# Patient Record
Sex: MALE | Race: WHITE | NOT HISPANIC OR LATINO | Employment: OTHER | URBAN - METROPOLITAN AREA
[De-identification: names, ages, dates, MRNs, and addresses within clinical notes are randomized per-mention and may not be internally consistent; named-entity substitution may affect disease eponyms.]

---

## 2021-09-29 ENCOUNTER — HOSPITAL ENCOUNTER (OUTPATIENT)
Dept: RADIOLOGY | Facility: IMAGING CENTER | Age: 81
Discharge: HOME | End: 2021-09-29
Payer: COMMERCIAL

## 2021-09-29 DIAGNOSIS — K76.0 FATTY LIVER: ICD-10-CM

## 2021-09-29 DIAGNOSIS — R79.89 ELEVATED LFTS: ICD-10-CM

## 2021-09-29 PROCEDURE — 76981 USE PARENCHYMA: CPT

## 2022-09-19 ENCOUNTER — OFFICE VISIT (OUTPATIENT)
Dept: PODIATRY | Facility: CLINIC | Age: 82
End: 2022-09-19
Payer: COMMERCIAL

## 2022-09-19 VITALS
WEIGHT: 238.5 LBS | BODY MASS INDEX: 36.15 KG/M2 | DIASTOLIC BLOOD PRESSURE: 72 MMHG | HEIGHT: 68 IN | SYSTOLIC BLOOD PRESSURE: 127 MMHG | HEART RATE: 97 BPM

## 2022-09-19 DIAGNOSIS — G60.9 NEUROPATHY, PERIPHERAL, IDIOPATHIC: ICD-10-CM

## 2022-09-19 DIAGNOSIS — B35.1 ONYCHOMYCOSIS: Primary | ICD-10-CM

## 2022-09-19 PROCEDURE — 99202 OFFICE O/P NEW SF 15 MIN: CPT | Performed by: PODIATRIST

## 2022-09-19 RX ORDER — APIXABAN 5 MG/1
TABLET, FILM COATED ORAL
COMMUNITY
Start: 2022-09-13

## 2022-09-19 RX ORDER — BETAMETHASONE DIPROPIONATE 0.5 MG/G
CREAM TOPICAL
COMMUNITY
Start: 2022-08-04

## 2022-09-19 RX ORDER — METOPROLOL TARTRATE 50 MG/1
50 TABLET, FILM COATED ORAL 2 TIMES DAILY
COMMUNITY
Start: 2022-09-13

## 2022-09-19 RX ORDER — OMEPRAZOLE 20 MG/1
CAPSULE, DELAYED RELEASE ORAL
COMMUNITY

## 2022-09-19 NOTE — PROGRESS NOTES
Assessment/Plan:    The patient's pedal nail plates were sharply debrided with a pair of sterile nail clippers today without incident  He will follow-up on as-needed basis for routine foot care  Diagnoses and all orders for this visit:    Onychomycosis    Neuropathy, peripheral, idiopathic    Other orders  -     Eliquis 5 MG  -     metoprolol tartrate (LOPRESSOR) 50 mg tablet; Take 50 mg by mouth 2 (two) times a day  -     betamethasone, augmented, (DIPROLENE-AF) 0 05 % cream; Apply topically  -     omeprazole (PriLOSEC) 20 mg delayed release capsule; Take by mouth          Subjective:      Patient ID: Yarely Rose is a [de-identified] y o  male  The patient presents today for his initial consultation with [facility] with a chief complaint of painful elongated toenails  He states that due to his neuropathy, his recent knee surgery, and that he is on Eliquis, he does not feel safe trimming his own toenails  They are starting to catch on his socks and sheets and is causing him discomfort in his shoes  The following portions of the patient's history were reviewed and updated as appropriate: allergies, current medications, past family history, past medical history, past social history, past surgical history and problem list       PAST MEDICAL HISTORY:  Past Medical History:   Diagnosis Date    Acid reflux     HTN (hypertension)     Nosebleed        PAST SURGICAL HISTORY:  History reviewed  No pertinent surgical history  ALLERGIES:  Patient has no known allergies      MEDICATIONS:  Current Outpatient Medications   Medication Sig Dispense Refill    betamethasone, augmented, (DIPROLENE-AF) 0 05 % cream Apply topically      Eliquis 5 MG       metoprolol tartrate (LOPRESSOR) 50 mg tablet Take 50 mg by mouth 2 (two) times a day      omeprazole (PriLOSEC) 20 mg delayed release capsule Take by mouth      Omeprazole Magnesium (PriLOSEC) 10 MG PACK       lisinopril (ZESTRIL) 5 mg tablet No current facility-administered medications for this visit  SOCIAL HISTORY:  Social History     Socioeconomic History    Marital status: /Civil Union     Spouse name: None    Number of children: None    Years of education: None    Highest education level: None   Occupational History    None   Tobacco Use    Smoking status: Former Smoker    Smokeless tobacco: Never Used   Substance and Sexual Activity    Alcohol use: None    Drug use: None    Sexual activity: None   Other Topics Concern    None   Social History Narrative    None     Social Determinants of Health     Financial Resource Strain: Not on file   Food Insecurity: Not on file   Transportation Needs: Not on file   Physical Activity: Not on file   Stress: Not on file   Social Connections: Not on file   Intimate Partner Violence: Not on file   Housing Stability: Not on file        Review of Systems   Constitutional: Negative for chills and fever  HENT: Negative for ear pain and sore throat  Eyes: Negative for pain and visual disturbance  Respiratory: Negative for cough and shortness of breath  Cardiovascular: Negative for chest pain and palpitations  Gastrointestinal: Negative for abdominal pain and vomiting  Genitourinary: Negative for dysuria and hematuria  Musculoskeletal: Negative for arthralgias and back pain  Skin: Negative for color change and rash  Neurological: Negative for seizures and syncope  Psychiatric/Behavioral: Negative  All other systems reviewed and are negative  Objective:      /72   Pulse 97   Ht 5' 8" (1 727 m)   Wt 108 kg (238 lb 8 oz)   BMI 36 26 kg/m²          Physical Exam  Vitals reviewed  Constitutional:       Appearance: Normal appearance  HENT:      Head: Normocephalic and atraumatic  Nose: Nose normal    Eyes:      Conjunctiva/sclera: Conjunctivae normal       Pupils: Pupils are equal, round, and reactive to light     Cardiovascular:      Pulses: Dorsalis pedis pulses are 2+ on the right side and 2+ on the left side  Posterior tibial pulses are 1+ on the right side and 1+ on the left side  Pulmonary:      Effort: Pulmonary effort is normal    Feet:      Right foot:      Skin integrity: Skin integrity normal       Toenail Condition: Right toenails are abnormally thick and long  Fungal disease present  Left foot:      Skin integrity: Skin integrity normal       Toenail Condition: Left toenails are abnormally thick and long  Fungal disease present  Comments: His pedal nail plates are mildly dystrophic with discoloration, thickness, and brittleness times 10; there are no open wounds nor pre trophic changes to either foot; interdigital spaces are clear  Skin:     General: Skin is warm  Capillary Refill: Capillary refill takes less than 2 seconds  Neurological:      General: No focal deficit present  Mental Status: He is alert and oriented to person, place, and time  Psychiatric:         Mood and Affect: Mood normal          Behavior: Behavior normal          Thought Content:  Thought content normal

## 2023-01-06 ENCOUNTER — OFFICE VISIT (OUTPATIENT)
Dept: PODIATRY | Facility: CLINIC | Age: 83
End: 2023-01-06

## 2023-01-06 VITALS
HEART RATE: 54 BPM | SYSTOLIC BLOOD PRESSURE: 161 MMHG | HEIGHT: 68 IN | BODY MASS INDEX: 37.13 KG/M2 | OXYGEN SATURATION: 100 % | DIASTOLIC BLOOD PRESSURE: 84 MMHG | WEIGHT: 245 LBS

## 2023-01-06 DIAGNOSIS — B35.1 ONYCHOMYCOSIS: Primary | ICD-10-CM

## 2023-01-06 RX ORDER — KETOROLAC TROMETHAMINE 5 MG/ML
SOLUTION OPHTHALMIC
COMMUNITY
Start: 2022-10-18

## 2023-01-07 NOTE — PROGRESS NOTES
Assessment/Plan:     The patient's clinical examination today is consistent with painful onychomycosis to his feet bilaterally  The pedal nail plates were sharply debrided with sterile clippers and mechanically reduced utilizing a rotary bur without incident  There are no other acute pedal issues noted today  Commend follow-up in 2 to 3 months or as needed  There are no diagnoses linked to this encounter  Subjective:     Patient ID: Michael Thorne is a [de-identified] y o  male  The patient presents today with a chief complaint of painful elongated pedal nail plates to his feet bilaterally  He notes a long history of idiopathic peripheral neuropathy  He notes difficulty trimming his own nails secondary to their thickness and girth  They become tender when elongated in close toed shoe gear  He is also on Eliquis secondary to history of A  fib  PAST MEDICAL HISTORY:  Past Medical History:   Diagnosis Date   • Acid reflux    • HTN (hypertension)    • Nosebleed        PAST SURGICAL HISTORY:  History reviewed  No pertinent surgical history  ALLERGIES:  Patient has no known allergies  MEDICATIONS:  Current Outpatient Medications   Medication Sig Dispense Refill   • betamethasone, augmented, (DIPROLENE-AF) 0 05 % cream Apply topically     • Eliquis 5 MG      • ketorolac (ACULAR) 0 5 % ophthalmic solution      • metoprolol tartrate (LOPRESSOR) 50 mg tablet Take 50 mg by mouth 2 (two) times a day     • omeprazole (PriLOSEC) 20 mg delayed release capsule Take by mouth     • Omeprazole Magnesium (PriLOSEC) 10 MG PACK      • lisinopril (ZESTRIL) 5 mg tablet        No current facility-administered medications for this visit         SOCIAL HISTORY:  Social History     Socioeconomic History   • Marital status: /Civil Union     Spouse name: None   • Number of children: None   • Years of education: None   • Highest education level: None   Occupational History   • None   Tobacco Use   • Smoking status: Former   • Smokeless tobacco: Never   Substance and Sexual Activity   • Alcohol use: None   • Drug use: None   • Sexual activity: None   Other Topics Concern   • None   Social History Narrative   • None     Social Determinants of Health     Financial Resource Strain: Not on file   Food Insecurity: Not on file   Transportation Needs: Not on file   Physical Activity: Not on file   Stress: Not on file   Social Connections: Not on file   Intimate Partner Violence: Not on file   Housing Stability: Not on file        Review of Systems   Constitutional: Negative  HENT: Negative  Eyes: Negative  Respiratory: Negative  Cardiovascular: Negative  Endocrine: Negative  Musculoskeletal: Negative  Neurological: Negative  Hematological: Negative  Psychiatric/Behavioral: Negative  Objective:     Physical Exam  Vitals reviewed  Constitutional:       Appearance: Normal appearance  HENT:      Head: Normocephalic and atraumatic  Nose: Nose normal    Eyes:      Conjunctiva/sclera: Conjunctivae normal       Pupils: Pupils are equal, round, and reactive to light  Cardiovascular:      Pulses:           Dorsalis pedis pulses are 1+ on the right side and 1+ on the left side  Posterior tibial pulses are 1+ on the right side and 1+ on the left side  Pulmonary:      Effort: Pulmonary effort is normal    Feet:      Right foot:      Toenail Condition: Right toenails are abnormally thick and long  Fungal disease present  Left foot:      Toenail Condition: Left toenails are abnormally thick and long  Fungal disease present  Comments: The pedal nail plates are thickened and elongated x10 with mild tenderness palpation; no signs of infection noted bilaterally; the interdigital spaces are clear and without maceration  Skin:     General: Skin is warm  Capillary Refill: Capillary refill takes less than 2 seconds  Neurological:      General: No focal deficit present        Mental Status: He is alert and oriented to person, place, and time  Psychiatric:         Mood and Affect: Mood normal          Behavior: Behavior normal          Thought Content:  Thought content normal

## 2023-07-20 ENCOUNTER — OFFICE VISIT (OUTPATIENT)
Dept: PODIATRY | Facility: CLINIC | Age: 83
End: 2023-07-20
Payer: COMMERCIAL

## 2023-07-20 VITALS
SYSTOLIC BLOOD PRESSURE: 157 MMHG | BODY MASS INDEX: 37.89 KG/M2 | DIASTOLIC BLOOD PRESSURE: 62 MMHG | HEART RATE: 53 BPM | HEIGHT: 68 IN | OXYGEN SATURATION: 98 % | WEIGHT: 250 LBS

## 2023-07-20 DIAGNOSIS — B35.1 ONYCHOMYCOSIS: Primary | ICD-10-CM

## 2023-07-20 DIAGNOSIS — G60.9 NEUROPATHY, PERIPHERAL, IDIOPATHIC: ICD-10-CM

## 2023-07-20 PROCEDURE — 99212 OFFICE O/P EST SF 10 MIN: CPT | Performed by: PODIATRIST

## 2023-07-20 NOTE — PROGRESS NOTES
Assessment/Plan:     The patient's clinical examination today is consistent with mycotic pedal nail plates bilaterally x 10. There is a spicule of nail along the medial border of the right hallucal nail plate that is mildly tender to palpation. There are no open skin lesions. The interdigital spaces are clear without maceration. The pedal nails are sharply debrided with a sterile nail clipper without complication. Care was taken to remove the offending nail borders of the hallucal nail plates bilaterally. The nails were then reduced in thickness and girth utilizing a rotary bur. There are no other acute pedal issues today.     Recommend follow-up on as-needed basis.        Diagnoses and all orders for this visit:    Onychomycosis    Neuropathy, peripheral, idiopathic          Subjective:     Patient ID: Katie Contreras is a 82 y.o. male. The patient presents today for follow-up of painful pedal nail plates bilaterally. The patient notes a history of peripheral neuropathy and also notes difficulty with lower back and knee pain which prevents him from trimming his own nails. He feels that he is noticing some increase in his neuropathic symptoms and notices some deficits in his ability to ambulate for prolonged periods. He defers physical therapy consultation.         PAST MEDICAL HISTORY:  Past Medical History:   Diagnosis Date   • Acid reflux    • HTN (hypertension)    • Nosebleed        PAST SURGICAL HISTORY:  History reviewed. No pertinent surgical history. ALLERGIES:  Patient has no known allergies.     MEDICATIONS:  Current Outpatient Medications   Medication Sig Dispense Refill   • Eliquis 5 MG      • ergocalciferol (ERGOCALCIFEROL) 1.25 MG (08745 UT) capsule Take 50,000 Units by mouth     • metoprolol tartrate (LOPRESSOR) 50 mg tablet Take 50 mg by mouth 2 (two) times a day     • Omeprazole Magnesium (PriLOSEC) 10 MG PACK      • betamethasone, augmented, (DIPROLENE-AF) 0.05 % cream Apply topically     • ketorolac (ACULAR) 0.5 % ophthalmic solution      • lisinopril (ZESTRIL) 5 mg tablet      • omeprazole (PriLOSEC) 20 mg delayed release capsule Take by mouth       No current facility-administered medications for this visit. SOCIAL HISTORY:  Social History     Socioeconomic History   • Marital status: /Civil Union     Spouse name: None   • Number of children: None   • Years of education: None   • Highest education level: None   Occupational History   • None   Tobacco Use   • Smoking status: Former   • Smokeless tobacco: Never   Vaping Use   • Vaping Use: Never used   Substance and Sexual Activity   • Alcohol use: None   • Drug use: None   • Sexual activity: None   Other Topics Concern   • None   Social History Narrative   • None     Social Determinants of Health     Financial Resource Strain: Not on file   Food Insecurity: Not on file   Transportation Needs: Not on file   Physical Activity: Not on file   Stress: Not on file   Social Connections: Not on file   Intimate Partner Violence: Not on file   Housing Stability: Not on file        Review of Systems   Constitutional: Negative. HENT: Negative. Eyes: Negative. Respiratory: Negative. Cardiovascular: Negative. Endocrine: Negative. Musculoskeletal: Negative. Skin: Negative. Neurological: Negative. Hematological: Negative. Psychiatric/Behavioral: Negative. Objective:     Physical Exam  Vitals reviewed. Constitutional:       Appearance: Normal appearance. HENT:      Head: Normocephalic and atraumatic. Nose: Nose normal.   Eyes:      Conjunctiva/sclera: Conjunctivae normal.      Pupils: Pupils are equal, round, and reactive to light. Cardiovascular:      Pulses:           Dorsalis pedis pulses are 1+ on the right side and 1+ on the left side. Posterior tibial pulses are 1+ on the right side and 1+ on the left side.    Pulmonary:      Effort: Pulmonary effort is normal.   Feet: Right foot:      Skin integrity: Skin integrity normal.      Left foot:      Skin integrity: Skin integrity normal.      Comments: The patient's clinical examination today is consistent with mycotic pedal nail plates bilaterally x 10. There is a spicule of nail along the medial border of the right hallucal nail plate that is mildly tender to palpation. There are no open skin lesions. The interdigital spaces are clear without maceration. Skin:     General: Skin is warm. Capillary Refill: Capillary refill takes less than 2 seconds. Neurological:      General: No focal deficit present. Mental Status: He is alert and oriented to person, place, and time. Psychiatric:         Mood and Affect: Mood normal.         Behavior: Behavior normal.         Thought Content:  Thought content normal.

## 2023-10-24 ENCOUNTER — OFFICE VISIT (OUTPATIENT)
Dept: PODIATRY | Facility: CLINIC | Age: 83
End: 2023-10-24
Payer: COMMERCIAL

## 2023-10-24 VITALS
HEIGHT: 68 IN | BODY MASS INDEX: 36.68 KG/M2 | OXYGEN SATURATION: 100 % | SYSTOLIC BLOOD PRESSURE: 140 MMHG | WEIGHT: 242 LBS | HEART RATE: 59 BPM | DIASTOLIC BLOOD PRESSURE: 77 MMHG

## 2023-10-24 DIAGNOSIS — G60.9 NEUROPATHY, PERIPHERAL, IDIOPATHIC: ICD-10-CM

## 2023-10-24 DIAGNOSIS — B35.1 ONYCHOMYCOSIS: Primary | ICD-10-CM

## 2023-10-24 DIAGNOSIS — L60.3 ONYCHODYSTROPHY: ICD-10-CM

## 2023-10-24 PROCEDURE — 11721 DEBRIDE NAIL 6 OR MORE: CPT | Performed by: PODIATRIST

## 2023-10-24 RX ORDER — ZALEPLON 10 MG/1
20 CAPSULE ORAL DAILY
COMMUNITY
Start: 2023-10-01 | End: 2023-10-31

## 2023-10-24 NOTE — PROGRESS NOTES
Assessment/Plan:     The patient's clinical examination today is consistent with mycotic pedal nail plates bilaterally x 10. There is a spicule of nail along the medial border of the right hallucal nail plate that is mildly tender to palpation. There are no open skin lesions. The interdigital spaces are clear without maceration. The pedal nails are sharply debrided with a sterile nail clipper without complication. Care was taken to remove the offending nail borders of the hallucal nail plates bilaterally. The nails were then reduced in thickness and girth utilizing a rotary bur. There are no other acute pedal issues today. Recommend follow-up on as-needed basis. Diagnoses and all orders for this visit:    Onychomycosis    Onychodystrophy    Neuropathy, peripheral, idiopathic    Other orders  -     zaleplon (SONATA) 10 MG capsule; Take 20 mg by mouth daily          Subjective:     Patient ID: Ana Rosa Jackson is a 82 y.o. male. The patient presents today for follow-up of painful pedal nail plates bilaterally. The patient notes a history of peripheral neuropathy of uncertain etiology and also notes difficulty with lower back and knee pain which prevents him from trimming his own nails. PAST MEDICAL HISTORY:  Past Medical History:   Diagnosis Date    Acid reflux     HTN (hypertension)     Nosebleed        PAST SURGICAL HISTORY:  History reviewed. No pertinent surgical history. ALLERGIES:  Patient has no known allergies.     MEDICATIONS:  Current Outpatient Medications   Medication Sig Dispense Refill    Eliquis 5 MG       metoprolol tartrate (LOPRESSOR) 50 mg tablet Take 50 mg by mouth 2 (two) times a day      Omeprazole Magnesium (PriLOSEC) 10 MG PACK       zaleplon (SONATA) 10 MG capsule Take 20 mg by mouth daily      betamethasone, augmented, (DIPROLENE-AF) 0.05 % cream Apply topically      ergocalciferol (ERGOCALCIFEROL) 1.25 MG (33035 UT) capsule Take 50,000 Units by mouth ketorolac (ACULAR) 0.5 % ophthalmic solution       lisinopril (ZESTRIL) 5 mg tablet       omeprazole (PriLOSEC) 20 mg delayed release capsule Take by mouth       No current facility-administered medications for this visit. SOCIAL HISTORY:  Social History     Socioeconomic History    Marital status: /Civil Union     Spouse name: None    Number of children: None    Years of education: None    Highest education level: None   Occupational History    None   Tobacco Use    Smoking status: Former    Smokeless tobacco: Never   Vaping Use    Vaping Use: Never used   Substance and Sexual Activity    Alcohol use: None    Drug use: None    Sexual activity: None   Other Topics Concern    None   Social History Narrative    None     Social Determinants of Health     Financial Resource Strain: Not on file   Food Insecurity: Not on file   Transportation Needs: Not on file   Physical Activity: Not on file   Stress: Not on file   Social Connections: Not on file   Intimate Partner Violence: Not on file   Housing Stability: Not on file        Review of Systems   Constitutional: Negative. HENT: Negative. Eyes: Negative. Respiratory: Negative. Cardiovascular: Negative. Endocrine: Negative. Musculoskeletal: Negative. Neurological: Negative. Hematological: Negative. Psychiatric/Behavioral: Negative. Objective:     Physical Exam  Vitals reviewed. Constitutional:       Appearance: Normal appearance. HENT:      Head: Normocephalic and atraumatic. Nose: Nose normal.   Eyes:      Conjunctiva/sclera: Conjunctivae normal.      Pupils: Pupils are equal, round, and reactive to light. Cardiovascular:      Pulses:           Dorsalis pedis pulses are 1+ on the right side and 1+ on the left side. Posterior tibial pulses are 1+ on the right side and 1+ on the left side. Pulmonary:      Effort: Pulmonary effort is normal.   Feet:      Comments:    The patient's clinical examination today is consistent with mycotic pedal nail plates bilaterally x 10. There is a spicule of nail along the medial border of the right hallucal nail plate that is mildly tender to palpation. There are no open skin lesions. The interdigital spaces are clear without maceration. Skin:     General: Skin is warm. Capillary Refill: Capillary refill takes less than 2 seconds. Neurological:      General: No focal deficit present. Mental Status: He is alert and oriented to person, place, and time. Psychiatric:         Mood and Affect: Mood normal.         Behavior: Behavior normal.         Thought Content:  Thought content normal.

## 2024-01-18 ENCOUNTER — OFFICE VISIT (OUTPATIENT)
Dept: PODIATRY | Facility: CLINIC | Age: 84
End: 2024-01-18
Payer: COMMERCIAL

## 2024-01-18 VITALS
HEART RATE: 56 BPM | SYSTOLIC BLOOD PRESSURE: 122 MMHG | DIASTOLIC BLOOD PRESSURE: 68 MMHG | WEIGHT: 238 LBS | HEIGHT: 68 IN | OXYGEN SATURATION: 98 % | BODY MASS INDEX: 36.07 KG/M2

## 2024-01-18 DIAGNOSIS — G60.9 NEUROPATHY, PERIPHERAL, IDIOPATHIC: ICD-10-CM

## 2024-01-18 DIAGNOSIS — B35.1 ONYCHOMYCOSIS: Primary | ICD-10-CM

## 2024-01-18 PROCEDURE — 11721 DEBRIDE NAIL 6 OR MORE: CPT | Performed by: PODIATRIST

## 2024-01-18 NOTE — PROGRESS NOTES
Assessment/Plan:     The patient's clinical examination today is consistent with mycotic pedal nail plates bilaterally x 10.  The nails are thickened, discolored, with brittleness and subungual debris.  There are no open skin lesions.  The interdigital spaces are clear without maceration.  Skin is thin with decreased turgor.  There is diminished hair growth of the bilateral lower extremities.     The pedal nails are sharply debrided with a sterile nail clipper without complication.  The nails were then reduced in thickness and girth utilizing a rotary bur.  There are no other acute pedal issues today.     Recommend follow-up on as-needed basis.        Diagnoses and all orders for this visit:    Onychomycosis    Neuropathy, peripheral, idiopathic          Subjective:     Patient ID: Yissel Hardwick is a 83 y.o. male.      The patient presents today for follow-up of painful pedal nail plates bilaterally.  The patient notes a history of peripheral neuropathy of uncertain etiology and also notes difficulty with lower back and knee pain which prevents him from trimming his own nails.  He states that the numbness in his feet seems to be holding steady and does not seem to be progressing.        PAST MEDICAL HISTORY:  Past Medical History:   Diagnosis Date    Acid reflux     HTN (hypertension)     Nosebleed        PAST SURGICAL HISTORY:  History reviewed. No pertinent surgical history.     ALLERGIES:  Patient has no known allergies.    MEDICATIONS:  Current Outpatient Medications   Medication Sig Dispense Refill    betamethasone, augmented, (DIPROLENE-AF) 0.05 % cream Apply topically      Eliquis 5 MG       metoprolol tartrate (LOPRESSOR) 50 mg tablet Take 50 mg by mouth 2 (two) times a day      Omeprazole Magnesium (PriLOSEC) 10 MG PACK       ergocalciferol (ERGOCALCIFEROL) 1.25 MG (40966 UT) capsule Take 50,000 Units by mouth      ketorolac (ACULAR) 0.5 % ophthalmic solution       lisinopril (ZESTRIL) 5 mg tablet        omeprazole (PriLOSEC) 20 mg delayed release capsule Take by mouth      zaleplon (SONATA) 10 MG capsule Take 20 mg by mouth daily       No current facility-administered medications for this visit.       SOCIAL HISTORY:  Social History     Socioeconomic History    Marital status: /Civil Union     Spouse name: None    Number of children: None    Years of education: None    Highest education level: None   Occupational History    None   Tobacco Use    Smoking status: Former    Smokeless tobacco: Never   Vaping Use    Vaping status: Never Used   Substance and Sexual Activity    Alcohol use: None    Drug use: None    Sexual activity: None   Other Topics Concern    None   Social History Narrative    None     Social Determinants of Health     Financial Resource Strain: Not on file   Food Insecurity: Not on file   Transportation Needs: Not on file   Physical Activity: Not on file   Stress: Not on file   Social Connections: Not on file   Intimate Partner Violence: Not on file   Housing Stability: Not on file        Review of Systems   Constitutional: Negative.    HENT: Negative.     Eyes: Negative.    Respiratory: Negative.     Cardiovascular: Negative.    Endocrine: Negative.    Musculoskeletal: Negative.    Neurological: Negative.    Hematological: Negative.    Psychiatric/Behavioral: Negative.           Objective:     Physical Exam  Vitals reviewed.   Constitutional:       Appearance: Normal appearance.   HENT:      Head: Normocephalic and atraumatic.      Nose: Nose normal.   Eyes:      Conjunctiva/sclera: Conjunctivae normal.      Pupils: Pupils are equal, round, and reactive to light.   Cardiovascular:      Pulses:           Dorsalis pedis pulses are 1+ on the right side and 1+ on the left side.        Posterior tibial pulses are 1+ on the right side and 1+ on the left side.   Pulmonary:      Effort: Pulmonary effort is normal.   Feet:      Right foot:      Toenail Condition: Right toenails are abnormally thick  and long. Fungal disease present.     Left foot:      Toenail Condition: Left toenails are abnormally thick and long. Fungal disease present.     Comments: The patient's clinical examination today is consistent with mycotic pedal nail plates bilaterally x 10.  The nails are thickened, discolored, with brittleness and subungual debris.  There are no open skin lesions.  The interdigital spaces are clear without maceration.  Skin is thin with decreased turgor.  There is diminished hair growth of the bilateral lower extremities.  Skin:     General: Skin is warm.      Capillary Refill: Capillary refill takes 2 to 3 seconds.   Neurological:      General: No focal deficit present.      Mental Status: He is alert and oriented to person, place, and time.   Psychiatric:         Mood and Affect: Mood normal.         Behavior: Behavior normal.         Thought Content: Thought content normal.

## 2024-04-18 ENCOUNTER — OFFICE VISIT (OUTPATIENT)
Dept: PODIATRY | Facility: CLINIC | Age: 84
End: 2024-04-18
Payer: COMMERCIAL

## 2024-04-18 VITALS
HEART RATE: 56 BPM | BODY MASS INDEX: 35.92 KG/M2 | WEIGHT: 237 LBS | HEIGHT: 68 IN | DIASTOLIC BLOOD PRESSURE: 85 MMHG | SYSTOLIC BLOOD PRESSURE: 126 MMHG | OXYGEN SATURATION: 98 %

## 2024-04-18 DIAGNOSIS — G60.9 NEUROPATHY, PERIPHERAL, IDIOPATHIC: ICD-10-CM

## 2024-04-18 DIAGNOSIS — I73.9 PVD (PERIPHERAL VASCULAR DISEASE) (HCC): ICD-10-CM

## 2024-04-18 DIAGNOSIS — B35.1 ONYCHOMYCOSIS: Primary | ICD-10-CM

## 2024-04-18 PROCEDURE — 11721 DEBRIDE NAIL 6 OR MORE: CPT | Performed by: PODIATRIST

## 2024-04-18 NOTE — PROGRESS NOTES
Assessment/Plan:     The patient's clinical examination today is consistent with mycotic pedal nail plates bilaterally x 10.  The nails are thickened, discolored, with brittleness and subungual debris.  There are no open skin lesions.  The interdigital spaces are clear without maceration.  Skin is thin with decreased turgor.  DP and PT pulses are nonpalpable.  Temperature gradient is increased bilaterally.  There is hair growth of the bilateral lower extremities.  Vibratory and epicritic sensations are absent secondary to peripheral neuropathy of the bilateral lower extremities.    The pedal nails were sharply debrided with a sterile nail clipper without complication.  The nails were then mechanically reduced in thickness and girth utilizing a rotary bur.  There are no other acute pedal issues today.     The patient will follow-up in 2 to 3 months for at risk footcare.        Diagnoses and all orders for this visit:    Onychomycosis    Neuropathy, peripheral, idiopathic    PVD (peripheral vascular disease) ([facility]) [I73.9]          Subjective:     Patient ID: Yissel Hradwick is a 83 y.o. male.    The patient presents today for follow-up of painful pedal nail plates bilaterally.  The patient notes a history of peripheral neuropathy of uncertain etiology and also notes difficulty with lower back and knee pain which prevents him from trimming his own nails.  He states that the numbness in his feet is very dense and he cannot feel the even palpating his feet.           PAST MEDICAL HISTORY:  Past Medical History:   Diagnosis Date    Acid reflux     HTN (hypertension)     Nosebleed        PAST SURGICAL HISTORY:  History reviewed. No pertinent surgical history.     ALLERGIES:  Patient has no known allergies.    MEDICATIONS:  Current Outpatient Medications   Medication Sig Dispense Refill    betamethasone, augmented, (DIPROLENE-AF) 0.05 % cream Apply topically      Eliquis 5 MG       metoprolol tartrate (LOPRESSOR) 50 mg tablet  Take 50 mg by mouth 2 (two) times a day      Omeprazole Magnesium (PriLOSEC) 10 MG PACK       ergocalciferol (ERGOCALCIFEROL) 1.25 MG (25023 UT) capsule Take 50,000 Units by mouth      ketorolac (ACULAR) 0.5 % ophthalmic solution       lisinopril (ZESTRIL) 5 mg tablet       omeprazole (PriLOSEC) 20 mg delayed release capsule Take by mouth      zaleplon (SONATA) 10 MG capsule Take 20 mg by mouth daily       No current facility-administered medications for this visit.       SOCIAL HISTORY:  Social History     Socioeconomic History    Marital status: /Civil Union     Spouse name: None    Number of children: None    Years of education: None    Highest education level: None   Occupational History    None   Tobacco Use    Smoking status: Former    Smokeless tobacco: Never   Vaping Use    Vaping status: Never Used   Substance and Sexual Activity    Alcohol use: None    Drug use: None    Sexual activity: None   Other Topics Concern    None   Social History Narrative    None     Social Determinants of Health     Financial Resource Strain: Not on file   Food Insecurity: Not on file   Transportation Needs: Not on file   Physical Activity: Not on file   Stress: Not on file   Social Connections: Not on file   Intimate Partner Violence: Not on file   Housing Stability: Not on file        Review of Systems   Constitutional: Negative.    HENT: Negative.     Eyes: Negative.    Respiratory: Negative.     Cardiovascular: Negative.    Endocrine: Negative.    Musculoskeletal: Negative.    Neurological: Negative.    Hematological: Negative.    Psychiatric/Behavioral: Negative.           Objective:     Physical Exam  Vitals reviewed.   Constitutional:       Appearance: Normal appearance.   HENT:      Head: Normocephalic and atraumatic.      Nose: Nose normal.   Eyes:      Conjunctiva/sclera: Conjunctivae normal.      Pupils: Pupils are equal, round, and reactive to light.   Cardiovascular:      Pulses:           Dorsalis pedis  pulses are 0 on the right side and 0 on the left side.        Posterior tibial pulses are 0 on the right side and 0 on the left side.   Pulmonary:      Effort: Pulmonary effort is normal.   Feet:      Right foot:      Skin integrity: Skin integrity normal.      Toenail Condition: Right toenails are abnormally thick and long. Fungal disease present.     Left foot:      Skin integrity: Skin integrity normal.      Toenail Condition: Left toenails are abnormally thick and long. Fungal disease present.     Comments: The patient's clinical examination today is consistent with mycotic pedal nail plates bilaterally x 10.  The nails are thickened, discolored, with brittleness and subungual debris.  There are no open skin lesions.  The interdigital spaces are clear without maceration.  Skin is thin with decreased turgor.  DP and PT pulses are nonpalpable.  Temperature gradient is increased bilaterally.  There is hair growth of the bilateral lower extremities.  Vibratory and epicritic sensations are absent secondary to peripheral neuropathy of the bilateral lower extremities.  Skin:     General: Skin is warm.      Capillary Refill: Capillary refill takes less than 2 seconds.   Neurological:      General: No focal deficit present.      Mental Status: He is alert and oriented to person, place, and time.   Psychiatric:         Mood and Affect: Mood normal.         Behavior: Behavior normal.         Thought Content: Thought content normal.

## 2024-07-18 ENCOUNTER — OFFICE VISIT (OUTPATIENT)
Dept: PODIATRY | Facility: CLINIC | Age: 84
End: 2024-07-18
Payer: COMMERCIAL

## 2024-07-18 VITALS
DIASTOLIC BLOOD PRESSURE: 96 MMHG | BODY MASS INDEX: 35.92 KG/M2 | HEIGHT: 68 IN | HEART RATE: 54 BPM | SYSTOLIC BLOOD PRESSURE: 127 MMHG | OXYGEN SATURATION: 95 % | WEIGHT: 237 LBS

## 2024-07-18 DIAGNOSIS — G60.9 NEUROPATHY, PERIPHERAL, IDIOPATHIC: ICD-10-CM

## 2024-07-18 DIAGNOSIS — B35.1 ONYCHOMYCOSIS: Primary | ICD-10-CM

## 2024-07-18 DIAGNOSIS — I73.9 PVD (PERIPHERAL VASCULAR DISEASE) (HCC): ICD-10-CM

## 2024-07-18 PROCEDURE — RECHECK: Performed by: PODIATRIST

## 2024-07-18 PROCEDURE — 11721 DEBRIDE NAIL 6 OR MORE: CPT | Performed by: PODIATRIST

## 2024-07-18 NOTE — PROGRESS NOTES
Assessment/Plan:     The patient's clinical examination today is consistent with mycotic pedal nail plates bilaterally x 10.  The nails are thickened, discolored, brittle, with subungual debris.  There are no open skin lesions.  The interdigital spaces are clear without maceration.  Skin is thin with decreased turgor.  DP and PT pulses are nonpalpable.  Temperature gradient is increased bilaterally.  There is hair growth of the bilateral lower extremities.  Vibratory and epicritic sensations are absent secondary to peripheral neuropathy of the bilateral lower extremities.     The pedal nails were sharply debrided with a sterile nail clipper without complication.  The nails were then mechanically reduced in thickness and girth utilizing a rotary bur.  There are no other acute pedal issues today.     The patient will follow-up in 2 to 3 months for at risk footcare.        Diagnoses and all orders for this visit:    Onychomycosis    Neuropathy, peripheral, idiopathic    PVD (peripheral vascular disease) ([facility]) [I73.9]          Subjective:     Patient ID: Yissel Hardwick is a 83 y.o. male.    The patient presents today for follow-up of painful pedal nail plates bilaterally.  The patient notes a history of peripheral neuropathy of uncertain etiology and also notes difficulty with lower back and knee pain which prevents him from trimming his own nails.  He has a history of dense peripheral neuropathy stemming from chronic lower back issues.  He does note that he be trying acupuncture to see if it helps.  He also notes that he may be interested in consultation with pain and spine services.           PAST MEDICAL HISTORY:  Past Medical History:   Diagnosis Date    Acid reflux     HTN (hypertension)     Nosebleed        PAST SURGICAL HISTORY:  History reviewed. No pertinent surgical history.     ALLERGIES:  Patient has no known allergies.    MEDICATIONS:  Current Outpatient Medications   Medication Sig Dispense Refill     betamethasone, augmented, (DIPROLENE-AF) 0.05 % cream Apply topically      Eliquis 5 MG       metoprolol tartrate (LOPRESSOR) 50 mg tablet Take 50 mg by mouth 2 (two) times a day      Omeprazole Magnesium (PriLOSEC) 10 MG PACK       zaleplon (SONATA) 10 MG capsule Take 20 mg by mouth daily      ergocalciferol (ERGOCALCIFEROL) 1.25 MG (85813 UT) capsule Take 50,000 Units by mouth      ketorolac (ACULAR) 0.5 % ophthalmic solution       lisinopril (ZESTRIL) 5 mg tablet       omeprazole (PriLOSEC) 20 mg delayed release capsule Take by mouth       No current facility-administered medications for this visit.       SOCIAL HISTORY:  Social History     Socioeconomic History    Marital status: /Civil Union     Spouse name: None    Number of children: None    Years of education: None    Highest education level: None   Occupational History    None   Tobacco Use    Smoking status: Former    Smokeless tobacco: Never   Vaping Use    Vaping status: Never Used   Substance and Sexual Activity    Alcohol use: None    Drug use: None    Sexual activity: None   Other Topics Concern    None   Social History Narrative    None     Social Determinants of Health     Financial Resource Strain: Not on file   Food Insecurity: Not on file   Transportation Needs: Not on file   Physical Activity: Not on file   Stress: Not on file   Social Connections: Not on file   Intimate Partner Violence: Not on file   Housing Stability: Not on file        Review of Systems   Constitutional: Negative.    HENT: Negative.     Eyes: Negative.    Respiratory: Negative.     Cardiovascular: Negative.    Endocrine: Negative.    Musculoskeletal: Negative.    Neurological: Negative.    Hematological: Negative.    Psychiatric/Behavioral: Negative.           Objective:     Physical Exam  Vitals reviewed.   Constitutional:       Appearance: Normal appearance.   HENT:      Head: Normocephalic and atraumatic.      Nose: Nose normal.   Eyes:      Conjunctiva/sclera:  Conjunctivae normal.      Pupils: Pupils are equal, round, and reactive to light.   Cardiovascular:      Pulses:           Dorsalis pedis pulses are 0 on the right side and 0 on the left side.        Posterior tibial pulses are 0 on the right side and 0 on the left side.   Pulmonary:      Effort: Pulmonary effort is normal.   Feet:      Right foot:      Skin integrity: Skin integrity normal.      Toenail Condition: Right toenails are abnormally thick and long. Fungal disease present.     Left foot:      Skin integrity: Skin integrity normal.      Toenail Condition: Left toenails are abnormally thick and long. Fungal disease present.     Comments: The patient's clinical examination today is consistent with mycotic pedal nail plates bilaterally x 10.  The nails are thickened, discolored, brittle, with subungual debris.  There are no open skin lesions.  The interdigital spaces are clear without maceration.  Skin is thin with decreased turgor.  DP and PT pulses are nonpalpable.  Temperature gradient is increased bilaterally.  There is hair growth of the bilateral lower extremities.  Vibratory and epicritic sensations are absent secondary to peripheral neuropathy of the bilateral lower extremities.  Skin:     General: Skin is warm.      Capillary Refill: Capillary refill takes 2 to 3 seconds.   Neurological:      General: No focal deficit present.      Mental Status: He is alert and oriented to person, place, and time.   Psychiatric:         Mood and Affect: Mood normal.         Behavior: Behavior normal.         Thought Content: Thought content normal.

## 2024-10-15 ENCOUNTER — OFFICE VISIT (OUTPATIENT)
Dept: PODIATRY | Facility: CLINIC | Age: 84
End: 2024-10-15
Payer: COMMERCIAL

## 2024-10-15 VITALS
WEIGHT: 238 LBS | SYSTOLIC BLOOD PRESSURE: 125 MMHG | OXYGEN SATURATION: 97 % | BODY MASS INDEX: 36.07 KG/M2 | DIASTOLIC BLOOD PRESSURE: 75 MMHG | HEIGHT: 68 IN | HEART RATE: 56 BPM

## 2024-10-15 DIAGNOSIS — I73.9 PVD (PERIPHERAL VASCULAR DISEASE) (HCC): ICD-10-CM

## 2024-10-15 DIAGNOSIS — G60.9 NEUROPATHY, PERIPHERAL, IDIOPATHIC: ICD-10-CM

## 2024-10-15 DIAGNOSIS — B35.1 ONYCHOMYCOSIS: Primary | ICD-10-CM

## 2024-10-15 PROCEDURE — RECHECK: Performed by: PODIATRIST

## 2024-10-15 PROCEDURE — 11721 DEBRIDE NAIL 6 OR MORE: CPT | Performed by: PODIATRIST

## 2024-10-15 NOTE — PROGRESS NOTES
Assessment/Plan:     The patient's clinical examination today is consistent with mycotic pedal nail plates bilaterally x 10.  The nails are thickened, discolored, brittle, with subungual debris.  There are no open skin lesions.  The interdigital spaces are clear without maceration.  Skin is thin with decreased turgor.  DP and PT pulses are nonpalpable.  Temperature gradient is increased bilaterally.  There is absence of hair growth of the bilateral lower extremities.  Vibratory and epicritic sensations are absent secondary to peripheral neuropathy of the bilateral lower extremities.  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     The pedal nails were sharply debrided with a sterile nail clipper without complication.  The nails were then mechanically reduced in thickness and girth utilizing a rotary bur.  There are no other acute pedal issues today.  He is currently ambulating with a carbon fiber AFOs which does help to manage his dropfoot.     The patient will follow-up in 2 to 3 months for at risk footcare.        Diagnoses and all orders for this visit:    Onychomycosis    Neuropathy, peripheral, idiopathic    PVD (peripheral vascular disease) ([facility]) [I73.9]          Subjective:     Patient ID: Yissel Hardwick is a 84 y.o. male.    The patient presents today for follow-up of painful pedal nail plates bilaterally.  The patient notes a history of peripheral neuropathy of uncertain etiology and notes a progressive dropfoot deformity which requires use of carbon fiber AFOs.  He does have an extensive history of chronic low back pain and does note an upcoming consultation with a new pain and spine physician.        Review of Systems   Constitutional: Negative.    HENT: Negative.     Eyes: Negative.    Respiratory: Negative.     Cardiovascular: Negative.    Endocrine: Negative.    Musculoskeletal: Negative.    Neurological: Negative.    Hematological: Negative.    Psychiatric/Behavioral:  Negative.           Objective:     Physical Exam  Vitals reviewed.   Constitutional:       Appearance: Normal appearance.   HENT:      Head: Normocephalic and atraumatic.      Nose: Nose normal.   Eyes:      Conjunctiva/sclera: Conjunctivae normal.      Pupils: Pupils are equal, round, and reactive to light.   Cardiovascular:      Pulses:           Dorsalis pedis pulses are 1+ on the right side and 1+ on the left side.        Posterior tibial pulses are 0 on the right side and 0 on the left side.   Pulmonary:      Effort: Pulmonary effort is normal.   Musculoskeletal:      Right foot: Foot drop present.      Left foot: Foot drop present.   Feet:      Right foot:      Skin integrity: Skin integrity normal.      Toenail Condition: Right toenails are abnormally thick and long. Fungal disease present.     Left foot:      Skin integrity: Skin integrity normal.      Toenail Condition: Left toenails are abnormally thick and long. Fungal disease present.     Comments: The patient's clinical examination today is consistent with mycotic pedal nail plates bilaterally x 10.  The nails are thickened, discolored, brittle, with subungual debris.  There are no open skin lesions.  The interdigital spaces are clear without maceration.  Skin is thin with decreased turgor.  DP and PT pulses are nonpalpable.  Temperature gradient is increased bilaterally.  There is absence of hair growth of the bilateral lower extremities.  Vibratory and epicritic sensations are absent secondary to peripheral neuropathy of the bilateral lower extremities.  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     Skin:     General: Skin is warm.      Capillary Refill: Capillary refill takes 2 to 3 seconds.   Neurological:      General: No focal deficit present.      Mental Status: He is alert and oriented to person, place, and time.   Psychiatric:         Mood and Affect: Mood normal.         Behavior: Behavior normal.          Thought Content: Thought content normal.

## 2024-10-29 ENCOUNTER — CONSULT (OUTPATIENT)
Dept: PAIN MEDICINE | Facility: CLINIC | Age: 84
End: 2024-10-29
Payer: COMMERCIAL

## 2024-10-29 VITALS — BODY MASS INDEX: 36.07 KG/M2 | WEIGHT: 238 LBS | HEIGHT: 68 IN

## 2024-10-29 DIAGNOSIS — M47.816 LUMBAR SPONDYLOSIS: Primary | ICD-10-CM

## 2024-10-29 DIAGNOSIS — Z98.1 S/P LUMBAR SPINAL FUSION: ICD-10-CM

## 2024-10-29 PROCEDURE — G2211 COMPLEX E/M VISIT ADD ON: HCPCS | Performed by: ANESTHESIOLOGY

## 2024-10-29 PROCEDURE — 99204 OFFICE O/P NEW MOD 45 MIN: CPT | Performed by: ANESTHESIOLOGY

## 2024-10-29 NOTE — PROGRESS NOTES
Assessment  1. Lumbar spondylosis    2. S/P lumbar spinal fusion      Patient presenting with ongoing chronic back pain for greater than 10 years, worsening over the past year.  Pain is consistent with lumbar spondylosis, s/p lumbar spinal fusion accompanied by pain 6/10 on the pain scale with inability to participate in IADLs for >6 weeks.  Patient has tried numerous medication classes with varied benefit. He takes oxycodone PRN with benefit.   Denies any bowel or bladder incontinence, saddle anesthesia.    In regards to the patient's  pathology, we discussed the various treatment options including physical therapy, chiropractic treatment, medication management, activity modifications, interventional spine procedures.     Independently reviewed and interpreted prior lumbar MRI and CT lumbar spine which showed multilevel spondylosis with intact prior lumbar decompression and hardware. No significant spinal canal or foraminal stenosis.    The patient had prior ESIs with relief- ultimately these provided no further relief. He then had lumbar RFA with no significant improvement at L4-5 bilaterally.    Plan:    Discussed that possible MBB/RFA above his fusion may provide more benefit (bilateral L2-3 MBB/RFA).  At this time he is hesistant to pursue additional interventional options given his history of lack of relief in the past. I did discuss that L2-3 would be a different target than L4-5.    At this time I would also recommend obtaining updated lumbar MRI for further evaluation given his continued significant back pain symptoms and history of lumbar spinal fusion.    Follow-up after lumbar MRI or sooner as warranted.    Reviewed external notes from outside pain management, physiatry, orthopedic surgery offices for review and interpretation of recent and prior relevant medical histories, treatment recommendations, medication and/or interventional treatment responses.  .  Reviewed hemoglobin A1c, renal function, CBC  and/or PT/INR prior to discussing/offering interventional modalities.    [place] Prescription Drug Monitoring Program report was reviewed and was appropriate     My impressions and treatment recommendations were discussed in detail with the patient who verbalized understanding and had no further questions.  Discharge instructions were provided. I personally saw and examined the patient and I agree with the above discussed plan of care.    Orders Placed This Encounter   Procedures    MRI lumbar spine w wo contrast     Standing Status:   Future     Standing Expiration Date:   10/29/2028     Scheduling Instructions:      There is no preparation for this test. Please leave your jewelry and valuables at home, wedding rings are the exception. All patients will be required to change into a hospital gown and pants.  Street clothes are not permitted in the MRI.  Magnetic nail polish must be removed prior to arrival for your test. Please bring your insurance cards, a form of photo ID and a list of your medications with you. Arrive 15 minutes prior to your appointment time in order to register. Please bring any prior CT or MRI studies of this area that were not performed at a [facility].            To schedule this appointment, please contact Central Scheduling at [phone].            Prior to your appointment, please make sure you complete the MRI Screening Form when you e-Check in for your appointment. This will be available starting 7 days before your appointment in [employer]. You may receive an e-mail with an activation code if you do not have a [employer] account. If you do not have access to a device, we will complete your screening at your appointment.     Order Specific Question:   Reason for Exam     Answer:   history of lumbar spinal fusion, continued back pain     Order Specific Question:   What is the patient's sedation requirement?     Answer:   No Sedation     Order Specific Question:   Does the  patient need medication for Claustrophobia? If yes, order medication at this point.     Answer:   Yes     Order Specific Question:   Does the patient wear a life vest, have an implanted cardiac device, a stimulation device, a sleep apnea stimulator, or a breast tissue expansion device?     Answer:   No     Order Specific Question:   Please evaluate if any patient has any of the following risk factors that require renal function labs within 90 days prior to the MRI appointment.     Answer:   None of the Above     Order Specific Question:   Release to patient through Mychart     Answer:   Immediate     No orders of the defined types were placed in this encounter.      History of Present Illness    Yissel Hardwick is a 84 y.o. male presenting for consultation at [facility] for exam and evaluation of chronic lower back pain for greater than 10 years, worsening over the past year. Pain started without any precipitating injury or trauma. Over the past month, the intensity of pain has been Moderate to severe. Pain is currently 6/10. Pain does interfere with age appropriate activities of daily living. Pain is nearly constant, with no typical pattern throughout the day. Pain is described as sharp, dull/aching. Patient denies weakness in the lower extremities. Assistance device used: cane.    Worsening factors noted: standing, bending, walking.   Improving factors noted: lying down.    Treatments tried:   TENS: yes  PT: yes  Acupuncture: yes  Injections: yes   Previous spine surgery: yes    Anticoagulation: yes, Eliquis    Medications tried:   Oxycodone, Tramadol  Voltaren gel  Tylenol, NSAIDs    I have personally reviewed and/or updated the patient's past medical history, past surgical history, family history, social history, current medications, allergies, and vital signs today.     Review of Systems   Constitutional:  Negative for chills and fever.   HENT:  Negative for ear pain and sore throat.   "  Eyes:  Negative for pain and visual disturbance.   Respiratory:  Negative for cough and shortness of breath.    Cardiovascular:  Negative for chest pain and palpitations.   Gastrointestinal:  Negative for abdominal pain and vomiting.   Genitourinary:  Negative for dysuria and hematuria.   Musculoskeletal:  Positive for arthralgias, back pain, gait problem and myalgias.   Skin:  Negative for color change and rash.   Neurological:  Positive for weakness and numbness. Negative for seizures and syncope.   All other systems reviewed and are negative.      There is no problem list on file for this patient.      Past Medical History:   Diagnosis Date    Acid reflux     HTN (hypertension)     Nosebleed        History reviewed. No pertinent surgical history.    History reviewed. No pertinent family history.    Social History     Occupational History    Not on file   Tobacco Use    Smoking status: Former    Smokeless tobacco: Never   Vaping Use    Vaping status: Never Used   Substance and Sexual Activity    Alcohol use: Not on file    Drug use: Not on file    Sexual activity: Not on file       Current Outpatient Medications on File Prior to Visit   Medication Sig    betamethasone, augmented, (DIPROLENE-AF) 0.05 % cream Apply topically    Eliquis 5 MG     metoprolol tartrate (LOPRESSOR) 50 mg tablet Take 50 mg by mouth 2 (two) times a day    Omeprazole Magnesium (PriLOSEC) 10 MG PACK     zaleplon (SONATA) 10 MG capsule Take 20 mg by mouth daily     No current facility-administered medications on file prior to visit.       No Known Allergies    Physical Exam    Ht 5' 8\" (1.727 m)   Wt 108 kg (238 lb)   BMI 36.19 kg/m²     Constitutional: normal, well developed, well nourished, alert, in no distress and non-toxic and no overt pain behavior.  Eyes: anicteric  HEENT: grossly intact  Neck: supple, symmetric, trachea midline and no masses   Pulmonary:even and unlabored  Cardiovascular:No edema or pitting edema " present  Skin:Normal without rashes or lesions and well hydrated  Psychiatric:Mood and affect appropriate  Neurologic: Motor function is grossly intact with no focal neurologic deficits   Musculoskeletal: limited lumbar spine ROM. Pain with extension and lateral flexion    Imaging  HISTORY: Follow-up spinal fusion.  COMPARISON: CT lumbar spine October 19, 2018.    TECHNIQUE: CT imaging of the lumbar spine was obtained without contrast.    FINDINGS:    Status post posterior lumbar fusion at L4-L5 utilizing paired posterior rods and  pedicle screws. Slight medial orientation of the right L5 pedicle screw is  unchanged. There is interspinous spacer at L3-L4. There is no hardware fracture  or periprosthetic lucency.    There is normal lumbar lordosis. The vertebral bodies are normal in height.  There is unchanged mild anterolisthesis of L4-L5. No fracture is identified. No  prevertebral soft tissue swelling.    L1-L2: Disc bulge and facet arthropathy. No significant foraminal or spinal  canal stenosis.    L2-L3: Disc bulge and facet arthropathy. No significant foraminal or spinal  canal stenosis.    L3-L4: Prior posterior decompression. Disc bulge and facet arthropathy. No  significant spinal canal stenosis. Mild left and moderate right foraminal  stenosis.    L4-L5: Disc bulge is mild spinal canal narrowing. No significant foraminal  stenosis.    L5-S1: Disc bulge with mild spinal canal narrowing. No significant foraminal  stenosis.  Procedure Note    Amaury Oleary MD - 09/15/2022  Formatting of this note might be different from the original.  HISTORY: Follow-up spinal fusion.  COMPARISON: CT lumbar spine October 19, 2018.    TECHNIQUE: CT imaging of the lumbar spine was obtained without contrast.    FINDINGS:    Status post posterior lumbar fusion at L4-L5 utilizing paired posterior rods and  pedicle screws. Slight medial orientation of the right L5 pedicle screw is  unchanged. There is interspinous spacer at L3-L4.  There is no hardware fracture  or periprosthetic lucency.    There is normal lumbar lordosis. The vertebral bodies are normal in height.  There is unchanged mild anterolisthesis of L4-L5. No fracture is identified. No  prevertebral soft tissue swelling.    L1-L2: Disc bulge and facet arthropathy. No significant foraminal or spinal  canal stenosis.    L2-L3: Disc bulge and facet arthropathy. No significant foraminal or spinal  canal stenosis.    L3-L4: Prior posterior decompression. Disc bulge and facet arthropathy. No  significant spinal canal stenosis. Mild left and moderate right foraminal  stenosis.    L4-L5: Disc bulge is mild spinal canal narrowing. No significant foraminal  stenosis.    L5-S1: Disc bulge with mild spinal canal narrowing. No significant foraminal  stenosis.    IMPRESSION:  IMPRESSION:    There is posterior lumbar fusion at L4-L5 and interspinous spacer at L3-L4. No  hardware complication identified.    Multilevel lumbar spondylosis is slightly progressed compared to 2018. Moderate  right foraminal stenosis at L3-L4.      CT examination performed with dose lowering protocol in accordance with ALARA.

## 2024-11-15 ENCOUNTER — TELEPHONE (OUTPATIENT)
Age: 84
End: 2024-11-15

## 2024-11-15 DIAGNOSIS — Z98.1 S/P LUMBAR SPINAL FUSION: ICD-10-CM

## 2024-11-15 DIAGNOSIS — M47.817 LUMBOSACRAL SPONDYLOSIS WITHOUT MYELOPATHY: Primary | ICD-10-CM

## 2024-11-15 NOTE — TELEPHONE ENCOUNTER
Caller: lino    Doctor: aleah    Reason for call: pt would like xanax for his mri. Please advise.    Call back#: [phone]

## 2024-11-18 RX ORDER — ALPRAZOLAM 1 MG/1
TABLET ORAL
Qty: 1 TABLET | Refills: 0 | Status: SHIPPED | OUTPATIENT
Start: 2024-11-18 | End: 2024-11-19

## 2024-11-18 NOTE — TELEPHONE ENCOUNTER
Caller: lino    Doctor: aleah    Reason for call: pt would like to get his xanax please advise.    Call back#: [phone]

## 2024-11-18 NOTE — TELEPHONE ENCOUNTER
Caller: lino Dey    Doctor: Zara     Reason for call: pt script was for the ALPRAZolam (XANAX) 1 mg tablet  was sent to the wrong pharmacy. The script should have gone to   [facility]   [address]   [phone]    Pt would like a call back once the script has been sent over.       Call back#: [phone]

## 2024-11-19 RX ORDER — ALPRAZOLAM 1 MG/1
TABLET ORAL
Qty: 2 TABLET | Refills: 0 | Status: SHIPPED | OUTPATIENT
Start: 2024-11-19

## 2024-11-19 NOTE — TELEPHONE ENCOUNTER
Message was sent to Dr Zuñiga previously regarding pt's request. Dr Zuñiga is seeing pts and as soon as he has a heidi to respond we will contact pt.

## 2024-11-19 NOTE — TELEPHONE ENCOUNTER
Caller: lino Dey    Doctor: Zara    Reason for call: pt calling again about the xanax.  Pt also mentioned that Dr. Zuñiga told him when he was in the office that the dr would give him 2 xanax.  Pt is frustrated that he's been dealing with this since Friday and his MRI is tomorrow.    Pt stated he will be calling back again at 1    [facility]   [address]   [phone]    Call back#: [phone]

## 2024-11-19 NOTE — TELEPHONE ENCOUNTER
Caller: lino    Doctor: aleah    Reason for call: pt is calling stating [facility]    Call back#: [phone]

## 2024-11-20 ENCOUNTER — HOSPITAL ENCOUNTER (OUTPATIENT)
Dept: RADIOLOGY | Facility: HOSPITAL | Age: 84
Discharge: HOME | End: 2024-11-20
Payer: COMMERCIAL

## 2024-11-20 ENCOUNTER — HOSPITAL ENCOUNTER (OUTPATIENT)
Dept: MRI IMAGING | Facility: HOSPITAL | Age: 84
Discharge: HOME | End: 2024-11-20
Payer: COMMERCIAL

## 2024-11-20 DIAGNOSIS — M47.816 LUMBAR SPONDYLOSIS: ICD-10-CM

## 2024-11-20 DIAGNOSIS — Z98.1 S/P LUMBAR SPINAL FUSION: ICD-10-CM

## 2024-11-20 PROCEDURE — A9585 GADOBUTROL INJECTION: HCPCS | Performed by: ANESTHESIOLOGY

## 2024-11-20 PROCEDURE — 72158 MRI LUMBAR SPINE W/O & W/DYE: CPT

## 2024-11-20 RX ORDER — GADOBUTROL 604.72 MG/ML
10 INJECTION INTRAVENOUS
Status: COMPLETED | OUTPATIENT
Start: 2024-11-20 | End: 2024-11-20

## 2024-11-20 RX ADMIN — GADOBUTROL 10 ML: 604.72 INJECTION INTRAVENOUS at 13:06

## 2024-12-10 ENCOUNTER — TELEPHONE (OUTPATIENT)
Age: 84
End: 2024-12-10

## 2024-12-10 ENCOUNTER — OFFICE VISIT (OUTPATIENT)
Dept: PAIN MEDICINE | Facility: CLINIC | Age: 84
End: 2024-12-10
Payer: COMMERCIAL

## 2024-12-10 VITALS — HEIGHT: 68 IN | BODY MASS INDEX: 36.07 KG/M2 | WEIGHT: 238 LBS

## 2024-12-10 DIAGNOSIS — M96.1 POST LAMINECTOMY SYNDROME: ICD-10-CM

## 2024-12-10 DIAGNOSIS — M54.16 LUMBAR RADICULITIS: Primary | ICD-10-CM

## 2024-12-10 PROCEDURE — G2211 COMPLEX E/M VISIT ADD ON: HCPCS | Performed by: ANESTHESIOLOGY

## 2024-12-10 PROCEDURE — 99214 OFFICE O/P EST MOD 30 MIN: CPT | Performed by: ANESTHESIOLOGY

## 2024-12-10 RX ORDER — PREGABALIN 75 MG/1
75 CAPSULE ORAL 2 TIMES DAILY
Qty: 60 CAPSULE | Refills: 0 | Status: SHIPPED | OUTPATIENT
Start: 2024-12-10

## 2024-12-10 NOTE — PROGRESS NOTES
Assessment:  1. Lumbar radiculitis    2. Post laminectomy syndrome      Patient presenting for follow up visit. He has a history of chronic back pain for greater than 10 years, worsening over the past year.  Pain is consistent with post laminectomy syndrome, accompanied by pain 6+/10 on the pain scale with inability to participate in IADLs for >6 weeks.  Patient has tried numerous medication classes with varied benefit. He takes oxycodone PRN with benefit.   Denies any bowel or bladder incontinence, saddle anesthesia.     Independently reviewed and interpreted recent lumbar MRI - this showed disc bulging at L3-4. Otherwise no significant spinal canal or foraminal stenosis.     The patient had prior ESIs with relief. He then had lumbar RFA with no significant improvement at L4-5 bilaterally.     Plan:     Discussed and offered bilateral L3 TF ALYSA. At this time he would like to think it over before scheduling.    Will start Lyrica 75mg BID trial.    F/u in 6 weeks.     Reviewed external notes from outside pain management, physiatry, orthopedic surgery offices for review and interpretation of recent and prior relevant medical histories, treatment recommendations, medication and/or interventional treatment responses.  .  Reviewed hemoglobin A1c, renal function, CBC and/or PT/INR prior to discussing/offering interventional modalities.    My impressions and treatment recommendations were discussed in detail with the patient who verbalized understanding and had no further questions.  Discharge instructions were provided. I personally saw and examined the patient and I agree with the above discussed plan of care.    No orders of the defined types were placed in this encounter.    New Medications Ordered This Visit   Medications    pregabalin (LYRICA) 75 mg capsule     Sig: Take 1 capsule (75 mg total) by mouth 2 (two) times a day     Dispense:  60 capsule     Refill:  0       History of Present Illness:  Yissel Hardwick is a  84 y.o. male who presents for a follow up office visit in regards to Back Pain.   The patient’s current symptoms include continued low back pain symptoms radiating to the lateral paraspinal regions. Pain is rated 5+/10 and described as a constant dull/aching, sharp pain throughout the day.    I have personally reviewed and/or updated the patient's past medical history, past surgical history, family history, social history, current medications, allergies, and vital signs today.     Review of Systems   Constitutional:  Negative for chills and fever.   HENT:  Negative for ear pain and sore throat.    Eyes:  Negative for pain and visual disturbance.   Respiratory:  Negative for cough and shortness of breath.    Cardiovascular:  Negative for chest pain and palpitations.   Gastrointestinal:  Negative for abdominal pain and vomiting.   Genitourinary:  Negative for dysuria and hematuria.   Musculoskeletal:  Positive for arthralgias, back pain, gait problem and myalgias.   Skin:  Negative for color change and rash.   Neurological:  Positive for weakness. Negative for seizures and syncope.   All other systems reviewed and are negative.      There is no problem list on file for this patient.      Past Medical History:   Diagnosis Date    Acid reflux     HTN (hypertension)     Nosebleed        History reviewed. No pertinent surgical history.    History reviewed. No pertinent family history.    Social History     Occupational History    Not on file   Tobacco Use    Smoking status: Former    Smokeless tobacco: Never   Vaping Use    Vaping status: Never Used   Substance and Sexual Activity    Alcohol use: Not on file    Drug use: Not on file    Sexual activity: Not on file       Current Outpatient Medications on File Prior to Visit   Medication Sig    ALPRAZolam (XANAX) 1 mg tablet Take Xanax 1mg 60 min before MRI and another 1mg 30 min before MRI    betamethasone, augmented, (DIPROLENE-AF) 0.05 % cream Apply topically    Eliquis  "5 MG     metoprolol tartrate (LOPRESSOR) 50 mg tablet Take 50 mg by mouth 2 (two) times a day    Omeprazole Magnesium (PriLOSEC) 10 MG PACK     zaleplon (SONATA) 10 MG capsule Take 20 mg by mouth daily     No current facility-administered medications on file prior to visit.       No Known Allergies    Physical Exam:    Ht 5' 8\" (1.727 m)   Wt 108 kg (238 lb)   BMI 36.19 kg/m²     Constitutional:normal, well developed, well nourished, alert, in no distress and non-toxic and no overt pain behavior.  Eyes:anicteric  HEENT:grossly intact  Neck:supple, symmetric, trachea midline and no masses   Pulmonary:even and unlabored  Cardiovascular:No edema or pitting edema present  Skin:Normal without rashes or lesions and well hydrated  Psychiatric:Mood and affect appropriate  Neurologic: Motor function is grossly intact with no focal neurologic deficits   Musculoskeletal: lateral flexion and extension elicits pain    Imaging  MRI lumbar spine:  FINDINGS:     VERTEBRAL BODIES:  There are 5 lumbar type vertebral bodies. Minimal levoscoliosis. Grade 1 anterior spondylolisthesis of L4 in relation to L5. The patient has undergone prior L4-5 fusion with bilateral pedicle screw placement. There is fusion of the   spinous processes at L3-4.     No compression fracture. Mild endplate marrow degenerative change at the L3-4 level.     SACRUM:  Normal signal within the sacrum. No evidence of insufficiency or stress fracture.     DISTAL CORD AND CONUS:  Normal size and signal within the distal cord and conus.     PARASPINAL SOFT TISSUES:  Paraspinal soft tissues are unremarkable.     LOWER THORACIC DISC SPACES:  Normal disc height and signal.  No disc herniation, canal stenosis or foraminal narrowing.     LUMBAR DISC SPACES:     L1-L2:  Normal.     L2-L3: Minimal annular bulging. No focal disc herniation. No canal stenosis or foraminal narrowing.     L3-L4: Disc desiccation with slight loss of disc height. Mild annular bulging. No canal " stenosis. Mild right foraminal narrowing without nerve compression.     L4-L5: Prior left laminectomy. Pedicle screws project appropriately. Annular bulging with annular fissures in the central and paramedian portions of the disc. No canal stenosis or foraminal narrowing.     L5-S1: Minor annular bulging and facet arthropathy without disc herniation, canal stenosis or foraminal narrowing.     POSTCONTRAST IMAGING:  No abnormal enhancement.     OTHER FINDINGS: Simple renal cysts bilaterally.     IMPRESSION:     Mild lumbar degenerative disc disease. Prior fusion of the spinous processes at L3-4 and prior L4-5 pedicle screw placement. Mild right foraminal narrowing at the L3-4 level.

## 2024-12-10 NOTE — TELEPHONE ENCOUNTER
PA for PREGABALIN 75 MG SUBMITTED to [employer]    via    [x]CMM-KEY:[id number]  []Surescripts-Case ID #   []Availity-Auth ID # NDC #   []Faxed to plan   []Other website   []Phone call Case ID #     [x]PA sent as URGENT    All office notes, labs and other pertaining documents and studies sent. Clinical questions answered. Awaiting determination from insurance company.     Turnaround time for your insurance to make a decision on your Prior Authorization can take 7-21 business days.

## 2024-12-11 NOTE — TELEPHONE ENCOUNTER
PA for PREGABALIN 75 MG APPROVED     Date(s) approved 10/10/2024-12/9/2025        Patient advised by          [x]Wymseehart Message  [x]Phone call   []LMOM  []L/M to call office as no active Communication consent on file  []Unable to leave detailed message as VM not approved on Communication consent       Pharmacy advised by    [x]Fax  []Phone call    Approval letter scanned into Media Yes

## 2025-01-14 ENCOUNTER — OFFICE VISIT (OUTPATIENT)
Dept: PODIATRY | Facility: CLINIC | Age: 85
End: 2025-01-14
Payer: COMMERCIAL

## 2025-01-14 VITALS
HEART RATE: 64 BPM | BODY MASS INDEX: 36.37 KG/M2 | WEIGHT: 240 LBS | OXYGEN SATURATION: 98 % | DIASTOLIC BLOOD PRESSURE: 84 MMHG | HEIGHT: 68 IN | SYSTOLIC BLOOD PRESSURE: 136 MMHG

## 2025-01-14 DIAGNOSIS — G60.9 NEUROPATHY, PERIPHERAL, IDIOPATHIC: ICD-10-CM

## 2025-01-14 DIAGNOSIS — B35.1 ONYCHOMYCOSIS: Primary | ICD-10-CM

## 2025-01-14 PROCEDURE — RECHECK: Performed by: PODIATRIST

## 2025-01-14 PROCEDURE — 11721 DEBRIDE NAIL 6 OR MORE: CPT | Performed by: PODIATRIST

## 2025-01-14 NOTE — PROGRESS NOTES
Assessment/Plan:     The patient's clinical examination today is essentially unchanged from his prior exam.  There are mycotic pedal nail plates x 10.  The nails are thickened, discolored, brittle, with subungual debris.  There are no open skin lesions.  The interdigital spaces are clear without maceration.  Skin is thin with decreased turgor.  DP and PT pulses are nonpalpable.  Temperature gradient is increased bilaterally.  There is absence of hair growth of the bilateral lower extremities.  Vibratory and epicritic sensations are absent secondary to peripheral neuropathy of the bilateral lower extremities.  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     The pedal nails were sharply debrided with a sterile nail clipper without complication.  The nails were then mechanically reduced in thickness and girth utilizing a rotary bur.  There are no other acute pedal issues today.  He is currently ambulating with a carbon fiber AFOs which does help to manage his dropfoot.     The patient will follow-up in 2 to 3 months for at risk footcare.     Diagnoses and all orders for this visit:    Onychomycosis    Neuropathy, peripheral, idiopathic          Subjective:     Patient ID: Yissel Hardwick is a 84 y.o. male.    The patient presents today for follow-up of painful pedal nail plates bilaterally.  The patient notes a history of peripheral neuropathy of uncertain etiology has a history of a progressive dropfoot deformity which requires use of carbon fiber AFOs bilaterally.  He does have an extensive history of chronic low back pain.      PAST MEDICAL HISTORY:  Past Medical History:   Diagnosis Date    Acid reflux     HTN (hypertension)     Nosebleed        PAST SURGICAL HISTORY:  History reviewed. No pertinent surgical history.     ALLERGIES:  Patient has no known allergies.    MEDICATIONS:  Current Outpatient Medications   Medication Sig Dispense Refill    betamethasone, augmented, (DIPROLENE-AF)  0.05 % cream Apply topically      Eliquis 5 MG       metoprolol tartrate (LOPRESSOR) 50 mg tablet Take 50 mg by mouth 2 (two) times a day      Omeprazole Magnesium (PriLOSEC) 10 MG PACK       pregabalin (LYRICA) 75 mg capsule Take 1 capsule (75 mg total) by mouth 2 (two) times a day 60 capsule 0    ALPRAZolam (XANAX) 1 mg tablet Take Xanax 1mg 60 min before MRI and another 1mg 30 min before MRI 2 tablet 0    zaleplon (SONATA) 10 MG capsule Take 20 mg by mouth daily       No current facility-administered medications for this visit.       SOCIAL HISTORY:  Social History     Socioeconomic History    Marital status: /Civil Union     Spouse name: None    Number of children: None    Years of education: None    Highest education level: None   Occupational History    None   Tobacco Use    Smoking status: Former    Smokeless tobacco: Never   Vaping Use    Vaping status: Never Used   Substance and Sexual Activity    Alcohol use: None    Drug use: None    Sexual activity: None   Other Topics Concern    None   Social History Narrative    None     Social Drivers of Health     Financial Resource Strain: Not on file   Food Insecurity: Not on file   Transportation Needs: Not on file   Physical Activity: Not on file   Stress: Not on file   Social Connections: Not on file   Intimate Partner Violence: Not on file   Housing Stability: Not on file        Review of Systems   Constitutional: Negative.    HENT: Negative.     Eyes: Negative.    Respiratory: Negative.     Cardiovascular: Negative.    Endocrine: Negative.    Musculoskeletal: Negative.    Neurological: Negative.    Hematological: Negative.    Psychiatric/Behavioral: Negative.           Objective:     Physical Exam  Vitals reviewed.   Constitutional:       Appearance: Normal appearance.   HENT:      Head: Normocephalic and atraumatic.      Nose: Nose normal.   Eyes:      Conjunctiva/sclera: Conjunctivae normal.      Pupils: Pupils are equal, round, and reactive to light.    Cardiovascular:      Pulses:           Dorsalis pedis pulses are 0 on the right side and 0 on the left side.        Posterior tibial pulses are 0 on the right side and 0 on the left side.   Pulmonary:      Effort: Pulmonary effort is normal.   Feet:      Right foot:      Skin integrity: Skin integrity normal.      Toenail Condition: Right toenails are abnormally thick and long. Fungal disease present.     Left foot:      Skin integrity: Skin integrity normal.      Toenail Condition: Left toenails are abnormally thick and long. Fungal disease present.     Comments: The patient's clinical examination today is essentially unchanged from his prior exam.  There are mycotic pedal nail plates x 10.  The nails are thickened, discolored, brittle, with subungual debris.  There are no open skin lesions.  The interdigital spaces are clear without maceration.  Skin is thin with decreased turgor.  DP and PT pulses are nonpalpable.  Temperature gradient is increased bilaterally.  There is absence of hair growth of the bilateral lower extremities.  Vibratory and epicritic sensations are absent secondary to peripheral neuropathy of the bilateral lower extremities.  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     Skin:     General: Skin is warm.      Capillary Refill: Capillary refill takes 2 to 3 seconds.   Neurological:      General: No focal deficit present.      Mental Status: He is alert and oriented to person, place, and time.   Psychiatric:         Mood and Affect: Mood normal.         Behavior: Behavior normal.         Thought Content: Thought content normal.

## 2025-01-24 ENCOUNTER — OFFICE VISIT (OUTPATIENT)
Dept: PAIN MEDICINE | Facility: CLINIC | Age: 85
End: 2025-01-24
Payer: COMMERCIAL

## 2025-01-24 VITALS — BODY MASS INDEX: 36.37 KG/M2 | HEIGHT: 68 IN | WEIGHT: 240 LBS

## 2025-01-24 DIAGNOSIS — M54.16 LUMBAR RADICULITIS: ICD-10-CM

## 2025-01-24 DIAGNOSIS — M47.816 LUMBAR SPONDYLOSIS: Primary | ICD-10-CM

## 2025-01-24 PROCEDURE — G2211 COMPLEX E/M VISIT ADD ON: HCPCS | Performed by: ANESTHESIOLOGY

## 2025-01-24 PROCEDURE — 99213 OFFICE O/P EST LOW 20 MIN: CPT | Performed by: ANESTHESIOLOGY

## 2025-01-24 RX ORDER — LORAZEPAM 1 MG/1
TABLET ORAL
COMMUNITY
Start: 2024-12-24

## 2025-01-24 RX ORDER — OXYCODONE HYDROCHLORIDE 5 MG/1
TABLET ORAL
COMMUNITY
Start: 2025-01-07

## 2025-01-24 NOTE — PROGRESS NOTES
Assessment:  1. Lumbar spondylosis    2. Lumbar radiculitis      Patient presenting for follow up visit. He has a history of chronic back pain for greater than 10 years, worsening over the past year.    Pain is consistent with post laminectomy syndrome, accompanied by pain 6+/10 on the pain scale with inability to participate in IADLs for >6 weeks.  Patient has tried numerous medication classes with varied benefit. He takes oxycodone PRN with benefit.     Denies any bowel or bladder incontinence, saddle anesthesia.     Independently reviewed and interpreted recent lumbar MRI - this showed disc bulging at L3-4. Otherwise no significant spinal canal or foraminal stenosis.     The patient had prior ESIs with relief. He then had lumbar RFA with no significant improvement at L4-5 bilaterally.     Plan:     Discussed and offered bilateral L3 TF ALYSA. At this time he is still not ready to pursue this. He also does not think he can tolerate ESIs without sedation.     Patient also not ready to consider SCS trial.    Will follow up as needed - I discussed that my plan of care would not deviate from Dr. Wise's management at [facility].    My impressions and treatment recommendations were discussed in detail with the patient who verbalized understanding and had no further questions.  Discharge instructions were provided. I personally saw and examined the patient and I agree with the above discussed plan of care.    No orders of the defined types were placed in this encounter.    New Medications Ordered This Visit   Medications    oxyCODONE (ROXICODONE) 5 immediate release tablet    LORazepam (ATIVAN) 1 mg tablet     Sig: Take 1 tablet 2 hours prior to MRI and second tablet 1 hour prior if needed for claustrophobia       History of Present Illness:  Yissel Hardwick is a 84 y.o. male who presents for a follow up office visit in regards to Back Pain.   The patient’s current symptoms include continued low back pain symptoms. Pain is rated  8/10 currently and described as a constant aching, sharp pain throughout the day.    I have personally reviewed and/or updated the patient's past medical history, past surgical history, family history, social history, current medications, allergies, and vital signs today.     Review of Systems   Constitutional:  Negative for chills and fever.   HENT:  Negative for ear pain and sore throat.    Eyes:  Negative for pain and visual disturbance.   Respiratory:  Negative for cough and shortness of breath.    Cardiovascular:  Negative for chest pain and palpitations.   Gastrointestinal:  Negative for abdominal pain and vomiting.   Genitourinary:  Negative for dysuria and hematuria.   Musculoskeletal:  Positive for arthralgias, back pain, gait problem and myalgias.   Skin:  Negative for color change and rash.   Neurological:  Positive for weakness. Negative for seizures and syncope.   All other systems reviewed and are negative.      There is no problem list on file for this patient.      Past Medical History:   Diagnosis Date    Acid reflux     HTN (hypertension)     Nosebleed        History reviewed. No pertinent surgical history.    History reviewed. No pertinent family history.    Social History     Occupational History    Not on file   Tobacco Use    Smoking status: Former    Smokeless tobacco: Never   Vaping Use    Vaping status: Never Used   Substance and Sexual Activity    Alcohol use: Not on file    Drug use: Not on file    Sexual activity: Not on file       Current Outpatient Medications on File Prior to Visit   Medication Sig    betamethasone, augmented, (DIPROLENE-AF) 0.05 % cream Apply topically    Eliquis 5 MG     LORazepam (ATIVAN) 1 mg tablet Take 1 tablet 2 hours prior to MRI and second tablet 1 hour prior if needed for claustrophobia    metoprolol tartrate (LOPRESSOR) 50 mg tablet Take 50 mg by mouth 2 (two) times a day    Omeprazole Magnesium (PriLOSEC) 10 MG PACK     oxyCODONE (ROXICODONE) 5 immediate  "release tablet     pregabalin (LYRICA) 75 mg capsule Take 1 capsule (75 mg total) by mouth 2 (two) times a day    ALPRAZolam (XANAX) 1 mg tablet Take Xanax 1mg 60 min before MRI and another 1mg 30 min before MRI (Patient not taking: Reported on 1/24/2025)    zaleplon (SONATA) 10 MG capsule Take 20 mg by mouth daily     No current facility-administered medications on file prior to visit.       No Known Allergies    Physical Exam:    Ht 5' 8\" (1.727 m)   Wt 109 kg (240 lb)   BMI 36.49 kg/m²     Constitutional:normal, well developed, well nourished, alert, in no distress and non-toxic and no overt pain behavior.  Eyes:anicteric  HEENT:grossly intact  Neck:supple, symmetric, trachea midline and no masses   Pulmonary:even and unlabored  Cardiovascular:No edema or pitting edema present  Skin:Normal without rashes or lesions and well hydrated  Psychiatric:Mood and affect appropriate  Neurologic: Motor function is grossly intact with no focal neurologic deficits   Musculoskeletal: limited lumbar spine ROM    Imaging    "

## 2025-04-21 ENCOUNTER — OFFICE VISIT (OUTPATIENT)
Dept: PODIATRY | Facility: CLINIC | Age: 85
End: 2025-04-21
Payer: COMMERCIAL

## 2025-04-21 VITALS — OXYGEN SATURATION: 95 % | BODY MASS INDEX: 36.68 KG/M2 | HEIGHT: 68 IN | HEART RATE: 63 BPM | WEIGHT: 242 LBS

## 2025-04-21 DIAGNOSIS — B35.1 ONYCHOMYCOSIS: Primary | ICD-10-CM

## 2025-04-21 DIAGNOSIS — I73.9 PVD (PERIPHERAL VASCULAR DISEASE) (HCC): ICD-10-CM

## 2025-04-21 DIAGNOSIS — G60.9 NEUROPATHY, PERIPHERAL, IDIOPATHIC: ICD-10-CM

## 2025-04-21 PROCEDURE — RECHECK: Performed by: PODIATRIST

## 2025-04-21 PROCEDURE — 11721 DEBRIDE NAIL 6 OR MORE: CPT | Performed by: PODIATRIST

## 2025-04-21 NOTE — PROGRESS NOTES
:  Assessment & Plan  Onychomycosis         Neuropathy, peripheral, idiopathic         PVD (peripheral vascular disease) ([facility]) [I73.9]           The patient's clinical examination today is significant for thickened, brittle pedal nail plates with discoloration and some nail debris consistent with onychomycosis x 10.  Interdigital spaces are clear.  There are no open wounds.  Skin is thin with decreased turgor.  Pulses nonpalpable bilaterally.  There is absent hair growth to the bilateral extremities.  Vibratory and epicritic sensations are absent bilaterally secondary to his history of peripheral neuropathy.    The pedal nail plates are sharply debrided with a stone a couple x 10 without complication.  The nails were then reduced in thickness utilizing rotary bur without incident.  There are no other acute pedal issues noted today.  Continue use of carbon fiber AFOs for management of his bilateral foot drop.    Recommend follow-up in 2 to 3 months for continued at risk diabetic footcare.    History of Present Illness     Yissel Hardwick is a 84 y.o. male   The patient presents today for follow-up at risk footcare.  The patient does have a history of peripheral neuropathy to both lower extremities.  He does have a history of of a bilateral foot drop and utilizes carbon fiber AFOs bilaterally.      PAST MEDICAL HISTORY:  Past Medical History:   Diagnosis Date    Acid reflux     HTN (hypertension)     Nosebleed        PAST SURGICAL HISTORY:  History reviewed. No pertinent surgical history.     ALLERGIES:  Patient has no known allergies.    MEDICATIONS:  Current Outpatient Medications   Medication Sig Dispense Refill    betamethasone, augmented, (DIPROLENE-AF) 0.05 % cream Apply topically      Eliquis 5 MG       metoprolol tartrate (LOPRESSOR) 50 mg tablet Take 50 mg by mouth 2 (two) times a day      Omeprazole Magnesium (PriLOSEC) 10 MG PACK       oxyCODONE (ROXICODONE) 5 immediate release tablet       pregabalin (LYRICA)  "75 mg capsule Take 1 capsule (75 mg total) by mouth 2 (two) times a day 60 capsule 0    ALPRAZolam (XANAX) 1 mg tablet Take Xanax 1mg 60 min before MRI and another 1mg 30 min before MRI (Patient not taking: Reported on 1/24/2025) 2 tablet 0    LORazepam (ATIVAN) 1 mg tablet Take 1 tablet 2 hours prior to MRI and second tablet 1 hour prior if needed for claustrophobia      zaleplon (SONATA) 10 MG capsule Take 20 mg by mouth daily       No current facility-administered medications for this visit.       SOCIAL HISTORY:  Social History     Socioeconomic History    Marital status: /Civil Union     Spouse name: None    Number of children: None    Years of education: None    Highest education level: None   Occupational History    None   Tobacco Use    Smoking status: Former    Smokeless tobacco: Never   Vaping Use    Vaping status: Never Used   Substance and Sexual Activity    Alcohol use: None    Drug use: None    Sexual activity: None   Other Topics Concern    None   Social History Narrative    None     Social Drivers of Health     Financial Resource Strain: Not on file   Food Insecurity: Not on file   Transportation Needs: Not on file   Physical Activity: Not on file   Stress: Not on file   Social Connections: Not on file   Intimate Partner Violence: Not on file   Housing Stability: Not on file      Review of Systems   Constitutional: Negative.    HENT: Negative.     Skin: Negative.      Objective   Pulse 63   Ht 5' 8\" (1.727 m)   Wt 110 kg (242 lb)   SpO2 95%   BMI 36.80 kg/m²      Physical Exam  Constitutional:       Appearance: Normal appearance.   HENT:      Head: Normocephalic and atraumatic.   Cardiovascular:      Pulses:           Dorsalis pedis pulses are 0 on the right side and 0 on the left side.        Posterior tibial pulses are 0 on the right side and 0 on the left side.   Pulmonary:      Effort: Pulmonary effort is normal.   Feet:      Right foot:      Skin integrity: Skin integrity normal.      " Toenail Condition: Right toenails are abnormally thick and long. Fungal disease present.     Left foot:      Skin integrity: Skin integrity normal.      Toenail Condition: Left toenails are abnormally thick and long. Fungal disease present.     Comments: Mycotic nails x 10.  There are no open lesions.  The patient does have a history of peripheral neuropathy to both lower extremities  Skin:     General: Skin is warm and dry.      Capillary Refill: Capillary refill takes less than 2 seconds.   Neurological:      General: No focal deficit present.      Mental Status: He is alert and oriented to person, place, and time.   Psychiatric:         Mood and Affect: Mood normal.

## 2025-07-14 ENCOUNTER — PROCEDURE VISIT (OUTPATIENT)
Dept: PODIATRY | Facility: CLINIC | Age: 85
End: 2025-07-14
Payer: COMMERCIAL

## 2025-07-14 VITALS — HEIGHT: 68 IN | WEIGHT: 244 LBS | OXYGEN SATURATION: 98 % | BODY MASS INDEX: 36.98 KG/M2 | HEART RATE: 66 BPM

## 2025-07-14 DIAGNOSIS — G60.9 NEUROPATHY, PERIPHERAL, IDIOPATHIC: ICD-10-CM

## 2025-07-14 DIAGNOSIS — I73.9 PVD (PERIPHERAL VASCULAR DISEASE) (HCC): ICD-10-CM

## 2025-07-14 DIAGNOSIS — B35.1 ONYCHOMYCOSIS: Primary | ICD-10-CM

## 2025-07-14 PROCEDURE — 11721 DEBRIDE NAIL 6 OR MORE: CPT | Performed by: PODIATRIST

## 2025-07-14 NOTE — PROGRESS NOTES
"Name: Yissel Hardwick      : 1940      MRN: [id number]  Encounter Provider: Sourav Ballard DPM  Encounter Date: 2025   Encounter department: [facility] PODIATRY [facility]  :  Assessment & Plan  Onychomycosis    The patient's clinical examination today is significant for thickened, brittle pedal nail plates with discoloration and some nail debris consistent with onychomycosis x 10.  Interdigital spaces are clear.  There are no open wounds.  Skin is thin with decreased turgor.  Pulses nonpalpable bilaterally.  There is absent hair growth to the bilateral extremities.  Vibratory and epicritic sensations are absent bilaterally secondary to his history of peripheral neuropathy.     The pedal nail plates are sharply debrided with a stone a couple x 10 without complication.  The nails were then reduced in thickness utilizing rotary bur without incident.  There are no other acute pedal issues noted today.       Recommend follow-up in 2 to 3 months for continued at risk diabetic footcare.    Neuropathy, peripheral, idiopathic         PVD (peripheral vascular disease) ([facility]) [I73.9]             History of Present Illness   HPI  Yissel Hardwick is a 84 y.o. male who presents today for follow-up at risk footcare.  The patient does have a history of peripheral neuropathy to both lower extremities.  He does have a history of a bilateral foot drop and utilizes carbon fiber AFOs bilaterally.  History obtained from: patient    Review of Systems   Constitutional: Negative.    Respiratory: Negative.     Cardiovascular: Negative.    Musculoskeletal: Negative.    Neurological:  Positive for numbness.   Psychiatric/Behavioral: Negative.       Pertinent Medical History     PAST MEDICAL HISTORY:  Past Medical History[1]    PAST SURGICAL HISTORY:  Past Surgical History[2]     ALLERGIES:  Patient has no known allergies.    MEDICATIONS:  Current Medications[3]             Objective   Pulse 66   Ht 5' 8\" (1.727 m)   Wt 111 " kg (244 lb)   SpO2 98%   BMI 37.10 kg/m²      Physical Exam  Constitutional:       Appearance: Normal appearance.   HENT:      Head: Normocephalic and atraumatic.     Cardiovascular:      Pulses:           Dorsalis pedis pulses are 0 on the right side and 0 on the left side.        Posterior tibial pulses are 0 on the right side and 0 on the left side.   Pulmonary:      Effort: Pulmonary effort is normal.   Feet:      Right foot:      Skin integrity: Skin integrity normal.      Toenail Condition: Right toenails are abnormally thick and long. Fungal disease present.     Left foot:      Skin integrity: Skin integrity normal.      Toenail Condition: Left toenails are abnormally thick and long. Fungal disease present.     Comments: The patient's clinical examination today is significant for thickened, brittle pedal nail plates with discoloration and some nail debris consistent with onychomycosis x 10.  Interdigital spaces are clear.  There are no open wounds.  Skin is thin with decreased turgor.  Pulses nonpalpable bilaterally.  There is absent hair growth to the bilateral extremities.  Vibratory and epicritic sensations are absent bilaterally secondary to his history of peripheral neuropathy.    Skin:     General: Skin is warm and dry.      Capillary Refill: Capillary refill takes 2 to 3 seconds.     Neurological:      General: No focal deficit present.      Mental Status: He is alert and oriented to person, place, and time.     Psychiatric:         Mood and Affect: Mood normal.                [1]   Past Medical History:  Diagnosis Date    Acid reflux     HTN (hypertension)     Nosebleed    [2] No past surgical history on file.  [3]   Current Outpatient Medications   Medication Sig Dispense Refill    betamethasone, augmented, (DIPROLENE-AF) 0.05 % cream Apply topically      Eliquis 5 MG       metoprolol tartrate (LOPRESSOR) 50 mg tablet Take 50 mg by mouth in the morning and 50 mg in the evening.      Omeprazole  Magnesium (PriLOSEC) 10 MG PACK       oxyCODONE (ROXICODONE) 5 immediate release tablet       ALPRAZolam (XANAX) 1 mg tablet Take Xanax 1mg 60 min before MRI and another 1mg 30 min before MRI (Patient not taking: Reported on 1/24/2025) 2 tablet 0    LORazepam (ATIVAN) 1 mg tablet Take 1 tablet 2 hours prior to MRI and second tablet 1 hour prior if needed for claustrophobia      pregabalin (LYRICA) 75 mg capsule Take 1 capsule (75 mg total) by mouth 2 (two) times a day 60 capsule 0    zaleplon (SONATA) 10 MG capsule Take 20 mg by mouth daily       No current facility-administered medications for this visit.